# Patient Record
Sex: MALE | Race: WHITE | Employment: FULL TIME | URBAN - METROPOLITAN AREA
[De-identification: names, ages, dates, MRNs, and addresses within clinical notes are randomized per-mention and may not be internally consistent; named-entity substitution may affect disease eponyms.]

---

## 2022-06-03 ENCOUNTER — HOSPITAL ENCOUNTER (OUTPATIENT)
Age: 32
Discharge: HOME | End: 2022-06-03

## 2022-06-03 LAB
ALBUMIN SERPL-MCNC: 4.4 G/DL (ref 3.5–5.1)
ALP BLD-CCNC: 87 U/L (ref 38–126)
ALT SERPL-CCNC: 20 U/L (ref 11–66)
ANION GAP SERPL CALCULATED.3IONS-SCNC: 12 MEQ/L (ref 8–16)
AST SERPL-CCNC: 20 U/L (ref 5–40)
BASOPHILS # BLD: 0.2 %
BASOPHILS ABSOLUTE: 0 THOU/MM3 (ref 0–0.1)
BILIRUB SERPL-MCNC: 1 MG/DL (ref 0.3–1.2)
BUN BLDV-MCNC: 10 MG/DL (ref 7–22)
CALCIUM SERPL-MCNC: 8.8 MG/DL (ref 8.5–10.5)
CHLORIDE BLD-SCNC: 102 MEQ/L (ref 98–111)
CHOLESTEROL, TOTAL: 175 MG/DL (ref 100–199)
CO2: 24 MEQ/L (ref 23–33)
CREAT SERPL-MCNC: 0.7 MG/DL (ref 0.4–1.2)
EOSINOPHIL # BLD: 2.9 %
EOSINOPHILS ABSOLUTE: 0.3 THOU/MM3 (ref 0–0.4)
ERYTHROCYTE [DISTWIDTH] IN BLOOD BY AUTOMATED COUNT: 17.9 % (ref 11.5–14.5)
ERYTHROCYTE [DISTWIDTH] IN BLOOD BY AUTOMATED COUNT: 50.3 FL (ref 35–45)
GFR SERPL CREATININE-BSD FRML MDRD: > 90 ML/MIN/1.73M2
GLUCOSE BLD-MCNC: 97 MG/DL (ref 70–108)
HCT VFR BLD CALC: 52.2 % (ref 42–52)
HDLC SERPL-MCNC: 32 MG/DL
HEMOGLOBIN: 16.1 GM/DL (ref 14–18)
IMMATURE GRANS (ABS): 0.04 THOU/MM3 (ref 0–0.07)
IMMATURE GRANULOCYTES: 0.5 %
LDL CHOLESTEROL CALCULATED: 123 MG/DL
LYMPHOCYTES # BLD: 23.5 %
LYMPHOCYTES ABSOLUTE: 2.1 THOU/MM3 (ref 1–4.8)
MCH RBC QN AUTO: 26.1 PG (ref 26–33)
MCHC RBC AUTO-ENTMCNC: 30.8 GM/DL (ref 32.2–35.5)
MCV RBC AUTO: 84.6 FL (ref 80–94)
MONOCYTES # BLD: 10.3 %
MONOCYTES ABSOLUTE: 0.9 THOU/MM3 (ref 0.4–1.3)
NUCLEATED RED BLOOD CELLS: 0 /100 WBC
PLATELET # BLD: 203 THOU/MM3 (ref 130–400)
PMV BLD AUTO: 10.2 FL (ref 9.4–12.4)
POTASSIUM SERPL-SCNC: 4.2 MEQ/L (ref 3.5–5.2)
RBC # BLD: 6.17 MILL/MM3 (ref 4.7–6.1)
SEG NEUTROPHILS: 62.6 %
SEGMENTED NEUTROPHILS ABSOLUTE COUNT: 5.5 THOU/MM3 (ref 1.8–7.7)
SODIUM BLD-SCNC: 138 MEQ/L (ref 135–145)
TOTAL PROTEIN: 7.3 G/DL (ref 6.1–8)
TRIGL SERPL-MCNC: 98 MG/DL (ref 0–199)
WBC # BLD: 8.8 THOU/MM3 (ref 4.8–10.8)

## 2023-02-13 ENCOUNTER — HOSPITAL ENCOUNTER (INPATIENT)
Age: 33
LOS: 1 days | Discharge: HOME | DRG: 754 | End: 2023-02-14
Attending: EMERGENCY MEDICINE | Admitting: PSYCHIATRY & NEUROLOGY
Payer: COMMERCIAL

## 2023-02-13 DIAGNOSIS — R45.851 SUICIDAL IDEATION: Primary | ICD-10-CM

## 2023-02-13 PROBLEM — F33.0 MDD (MAJOR DEPRESSIVE DISORDER), RECURRENT EPISODE, MILD (HCC): Status: ACTIVE | Noted: 2023-02-13

## 2023-02-13 LAB
ALBUMIN SERPL BCG-MCNC: 4.2 G/DL (ref 3.5–5.1)
ALP SERPL-CCNC: 77 U/L (ref 38–126)
ALT SERPL W/O P-5'-P-CCNC: 25 U/L (ref 11–66)
ANION GAP SERPL CALC-SCNC: 8 MEQ/L (ref 8–16)
APAP SERPL-MCNC: < 5 UG/ML (ref 0–20)
AST SERPL-CCNC: 17 U/L (ref 5–40)
BASOPHILS ABSOLUTE: 0 THOU/MM3 (ref 0–0.1)
BASOPHILS NFR BLD AUTO: 0.3 %
BILIRUB SERPL-MCNC: 1.5 MG/DL (ref 0.3–1.2)
BUN SERPL-MCNC: 10 MG/DL (ref 7–22)
CALCIUM SERPL-MCNC: 9 MG/DL (ref 8.5–10.5)
CHLORIDE SERPL-SCNC: 101 MEQ/L (ref 98–111)
CO2 SERPL-SCNC: 26 MEQ/L (ref 23–33)
CREAT SERPL-MCNC: 0.6 MG/DL (ref 0.4–1.2)
DEPRECATED RDW RBC AUTO: 43.1 FL (ref 35–45)
EOSINOPHIL NFR BLD AUTO: 1.2 %
EOSINOPHILS ABSOLUTE: 0.1 THOU/MM3 (ref 0–0.4)
ERYTHROCYTE [DISTWIDTH] IN BLOOD BY AUTOMATED COUNT: 14.2 % (ref 11.5–14.5)
ETHANOL SERPL-MCNC: < 0.01 %
GFR SERPL CREATININE-BSD FRML MDRD: > 60 ML/MIN/1.73M2
GLUCOSE SERPL-MCNC: 102 MG/DL (ref 70–108)
HCT VFR BLD AUTO: 47.7 % (ref 42–52)
HGB BLD-MCNC: 15.1 GM/DL (ref 14–18)
IMM GRANULOCYTES # BLD AUTO: 0.04 THOU/MM3 (ref 0–0.07)
IMM GRANULOCYTES NFR BLD AUTO: 0.5 %
LYMPHOCYTES ABSOLUTE: 2 THOU/MM3 (ref 1–4.8)
LYMPHOCYTES NFR BLD AUTO: 25.9 %
MCH RBC QN AUTO: 26.6 PG (ref 26–33)
MCHC RBC AUTO-ENTMCNC: 31.7 GM/DL (ref 32.2–35.5)
MCV RBC AUTO: 84.1 FL (ref 80–94)
MONOCYTES ABSOLUTE: 0.8 THOU/MM3 (ref 0.4–1.3)
MONOCYTES NFR BLD AUTO: 10.1 %
NEUTROPHILS NFR BLD AUTO: 62 %
NRBC BLD AUTO-RTO: 0 /100 WBC
OSMOLALITY SERPL CALC.SUM OF ELEC: 269.3 MOSMOL/KG (ref 275–300)
PLATELET # BLD AUTO: 203 THOU/MM3 (ref 130–400)
PMV BLD AUTO: 9.7 FL (ref 9.4–12.4)
POTASSIUM SERPL-SCNC: 4.5 MEQ/L (ref 3.5–5.2)
PROT SERPL-MCNC: 7.5 G/DL (ref 6.1–8)
RBC # BLD AUTO: 5.67 MILL/MM3 (ref 4.7–6.1)
SALICYLATES SERPL-MCNC: < 0.3 MG/DL (ref 2–10)
SEGMENTED NEUTROPHILS ABSOLUTE COUNT: 4.8 THOU/MM3 (ref 1.8–7.7)
SODIUM SERPL-SCNC: 135 MEQ/L (ref 135–145)
WBC # BLD AUTO: 7.7 THOU/MM3 (ref 4.8–10.8)

## 2023-02-13 PROCEDURE — 80179 DRUG ASSAY SALICYLATE: CPT

## 2023-02-13 PROCEDURE — 1240000000 HC EMOTIONAL WELLNESS R&B

## 2023-02-13 PROCEDURE — 99285 EMERGENCY DEPT VISIT HI MDM: CPT

## 2023-02-13 PROCEDURE — 82077 ASSAY SPEC XCP UR&BREATH IA: CPT

## 2023-02-13 PROCEDURE — 80053 COMPREHEN METABOLIC PANEL: CPT

## 2023-02-13 PROCEDURE — 93005 ELECTROCARDIOGRAM TRACING: CPT | Performed by: EMERGENCY MEDICINE

## 2023-02-13 PROCEDURE — 93010 ELECTROCARDIOGRAM REPORT: CPT | Performed by: INTERNAL MEDICINE

## 2023-02-13 PROCEDURE — 36415 COLL VENOUS BLD VENIPUNCTURE: CPT

## 2023-02-13 PROCEDURE — 85025 COMPLETE CBC W/AUTO DIFF WBC: CPT

## 2023-02-13 PROCEDURE — 6370000000 HC RX 637 (ALT 250 FOR IP): Performed by: PSYCHIATRY & NEUROLOGY

## 2023-02-13 PROCEDURE — 80143 DRUG ASSAY ACETAMINOPHEN: CPT

## 2023-02-13 RX ORDER — ACETAMINOPHEN 325 MG/1
650 TABLET ORAL EVERY 4 HOURS PRN
Status: DISCONTINUED | OUTPATIENT
Start: 2023-02-13 | End: 2023-02-14 | Stop reason: HOSPADM

## 2023-02-13 RX ORDER — TRAZODONE HYDROCHLORIDE 50 MG/1
50 TABLET ORAL NIGHTLY PRN
Status: DISCONTINUED | OUTPATIENT
Start: 2023-02-13 | End: 2023-02-14 | Stop reason: HOSPADM

## 2023-02-13 RX ORDER — IBUPROFEN 400 MG/1
400 TABLET ORAL EVERY 6 HOURS PRN
Status: DISCONTINUED | OUTPATIENT
Start: 2023-02-13 | End: 2023-02-14 | Stop reason: HOSPADM

## 2023-02-13 RX ORDER — BUSPIRONE HYDROCHLORIDE 15 MG/1
15 TABLET ORAL 3 TIMES DAILY
COMMUNITY

## 2023-02-13 RX ORDER — MAGNESIUM HYDROXIDE/ALUMINUM HYDROXICE/SIMETHICONE 120; 1200; 1200 MG/30ML; MG/30ML; MG/30ML
30 SUSPENSION ORAL EVERY 6 HOURS PRN
Status: DISCONTINUED | OUTPATIENT
Start: 2023-02-13 | End: 2023-02-14 | Stop reason: HOSPADM

## 2023-02-13 RX ORDER — NICOTINE 21 MG/24HR
1 PATCH, TRANSDERMAL 24 HOURS TRANSDERMAL DAILY
Status: DISCONTINUED | OUTPATIENT
Start: 2023-02-13 | End: 2023-02-14 | Stop reason: HOSPADM

## 2023-02-13 RX ORDER — HYDROXYZINE HYDROCHLORIDE 25 MG/1
25 TABLET, FILM COATED ORAL 3 TIMES DAILY PRN
COMMUNITY

## 2023-02-13 RX ORDER — HYDROXYZINE HYDROCHLORIDE 25 MG/1
50 TABLET, FILM COATED ORAL 3 TIMES DAILY PRN
Status: DISCONTINUED | OUTPATIENT
Start: 2023-02-13 | End: 2023-02-14 | Stop reason: HOSPADM

## 2023-02-13 RX ADMIN — ACETAMINOPHEN 650 MG: 325 TABLET ORAL at 15:55

## 2023-02-13 ASSESSMENT — PATIENT HEALTH QUESTIONNAIRE - PHQ9: SUM OF ALL RESPONSES TO PHQ QUESTIONS 1-9: 7

## 2023-02-13 ASSESSMENT — SLEEP AND FATIGUE QUESTIONNAIRES
DO YOU HAVE DIFFICULTY SLEEPING: YES
SLEEP PATTERN: DIFFICULTY FALLING ASLEEP;EARLY AWAKENING
AVERAGE NUMBER OF SLEEP HOURS: 3
DO YOU USE A SLEEP AID: NO

## 2023-02-13 ASSESSMENT — PAIN - FUNCTIONAL ASSESSMENT
PAIN_FUNCTIONAL_ASSESSMENT: ACTIVITIES ARE NOT PREVENTED
PAIN_FUNCTIONAL_ASSESSMENT: NONE - DENIES PAIN

## 2023-02-13 ASSESSMENT — LIFESTYLE VARIABLES
HOW MANY STANDARD DRINKS CONTAINING ALCOHOL DO YOU HAVE ON A TYPICAL DAY: PATIENT DOES NOT DRINK
HOW MANY STANDARD DRINKS CONTAINING ALCOHOL DO YOU HAVE ON A TYPICAL DAY: PATIENT DOES NOT DRINK
HOW OFTEN DO YOU HAVE A DRINK CONTAINING ALCOHOL: NEVER
HOW OFTEN DO YOU HAVE A DRINK CONTAINING ALCOHOL: NEVER

## 2023-02-13 ASSESSMENT — PAIN SCALES - WONG BAKER: WONGBAKER_NUMERICALRESPONSE: 0

## 2023-02-13 ASSESSMENT — PAIN SCALES - GENERAL
PAINLEVEL_OUTOF10: 0
PAINLEVEL_OUTOF10: 0
PAINLEVEL_OUTOF10: 4
PAINLEVEL_OUTOF10: 0
PAINLEVEL_OUTOF10: 0

## 2023-02-13 ASSESSMENT — PAIN DESCRIPTION - DESCRIPTORS: DESCRIPTORS: ACHING

## 2023-02-13 ASSESSMENT — PAIN DESCRIPTION - LOCATION: LOCATION: HEAD

## 2023-02-13 NOTE — ED TRIAGE NOTES
Presents to ED via police for mental health evaluation. Reports he has high anxiety and depression. Girlfriend called the  today because he made a suicidal comment. Denies plan. Patient placed in safe room that is ligature resistant with continuous monitoring in place. Provider notified, requested an assessment by behavioral health . Patient belongings secured in a locked lockers outside of the room. Explained suicide prevention precautions to the patient including constant observer.

## 2023-02-13 NOTE — DISCHARGE INSTRUCTIONS
Keep all follow-up appointments and take medications as directed. Call the hope line if needed at :  Ck Velázquez, and [facility]. [facility] [phone]. Rocco Armstrong [phone]. [facility] [phone]. [address] [employer] [phone]. Vee Álvarez and [place] [phone]    Symptoms to report to your Doctor:  Depression  Inability to eat, sleep, or have a bowel movement  Increased sleepiness and lethargy  Voices in your head  Any thoughts of harming self or others    Things to avoid:  Caffeine  Alcohol  No street drugs  Over the counter medications unless Ok'd by your physician or pharmacist.  Driving or operating machinery until full effects of your medications are known. Driving or operating machinery if dizzy or drowsy from medications. Use journal as directed. Education:  Illness and medication teaching was completed. Discharge Disposition: Patient was discharged to *** and was transported by***. Patient was accompanied by***. Information sent to next level of care:    ____[facility]    ___x_Discharge instructions    ____Behavioral Services Assessment    ____Hand off Summary    ____History and Physical    ____Last dose MAR    ____Patient transfer form    ____Other       [facility] Hotline:  [phone]    Crisis phone numbers:  Ck Velázquez, and .S. [facility] [phone]. [facility] [address] [facility] [phone]. [employer] [phone]. [address] Carmen Pierson [phone].     [facility]  [address] [facility], 100 [facility]  1307 [facility]  [address] [facility]  [facility] 166  [facility] 57  [facility], 40 [facility] [place] 99  Ariel Be[address]ley[address]aats 211  [address] [facility] 2210 [facility], [address]  [address] [place]  Recovery and [facility]  [address] [facility]  [phone]    [facility]  [address]  [address]iguel Angel [address], [address]  [address] [facility] [address]  [place], 199 [facility]  [address]  [address], 1000 [facility]  Recovery and [facility]  [address],Miguel Angel [address], 396 [place]  [phone]    [facility] [place]  [address] Fito Christianson 15, [address]  [address]Th Floor  1 [facility],5Th Floor West   [place] Rubina Lopez 799  677 [facility]  [address]  [place], [address]  Murali Purvis 180  [address]  [facility] 163   [place], 3000 [facility] [address]  [phone]

## 2023-02-13 NOTE — PROGRESS NOTES
Chief Complaint:   mental health problem      Provisional Diagnosis:  major depressive disorder      Risk, Psychosocial and Contextual Factors: currently laid off of work      Current MH Treatment: PCP      Present Suicidal Behavior:    Verbal: denies    Attempt: denies      Access to Weapons: denies      C-SSRS Current Suicide Risk: Low, Moderate or High:  low risk        Past Suicidal Behavior:    Verbal: yes    Attempts: denies      Self-Injurious/Self-Mutilation: denies      Traumatic Event Within Past 2 Weeks: denies      Current Abuse:  denies      Legal: denies      Violence: denies      Protective Factors:  positive support      Housing: lives with jillian, two bio children and two step-children      Clinical Summary:      Pt is a 27year old male who presents to ED on [facility]. Per [facility]: \"On 23  [facility], Piot[facility]'s sister requested one due to him texting his family saying he loved them all, after telling his mother he was depressed. I was later told by his girlfriend, he had sent her suicidal messages on the night of 23. Messages stated, it's his time to leave the earth, he was at peace, after being asked how his daughters would feel if he was dead, he replied, they won't see or find him. Other messages state, he's been ready for this moment, and he has already told his family he loved them. \". Pt reports he is here because the  showed up and put him in handcuffs. Pt denies any suicidal/homicidal ideation. Pt reports sometimes he feels overwhelmed and anxious and in those times he feels like he doesn't want to be here anymore. Pt denies forming any plan or having intent to kill himself. Pt states \"like if I was driving my truck and I wrecked and , thank God because I don't have to deal with this anymore, but I wouldn't do it on purpose\". Pt denies any hallucinations.  Pt reports his PCP prescribed him Buspirone and  Hydroxyzine which he does not take because the Buspirone makes him agitated/angry and the Hydroxyzine makes him tired. Pt reports marijauna use which he feels is helpful with his anxiety. Pt denies any other substance use. No delusions noted. Pt expressed concern as his fiance is currently in [place] and he is caring for their 4 children who are currently at school. Level of Care Disposition:      Consulted with Dr. Richard Jackson, psychiatry to be consulted prior to medical clearance. Consulted with Dr. Braydon Fuchs. Pt to be admitted to 4E. Dr. Richard Jackson updated on Dr. Mir Goodson recommendation to admit. Dr. Richard Jackson to order labs. Pt updated on POC. Pt upset, concerned about his kids. Reports he has court this Thursday for custody of his son. Pt provided with phone to contact pt mother. Pt mother contacted,  has been arranged by pt mom. Consulted with Dr. Richard Jackson. Pt is medically cleared. Report given to Nurse Viki Singer on 4E.

## 2023-02-13 NOTE — PROGRESS NOTES
Pt arrived on unit accompanied by ED staff and campus police in hospital scrubs and non skid slippers.  Provided gatorade introduced self as admission nurse
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
## 2023-02-13 NOTE — BH NOTE
INPATIENT RECREATIONAL THERAPY  ADULT BEHAVIORAL SERVICES  EVALUATION    REFERRING PHYSICIAN:  Dr. Casey Adam  DIAGNOSIS:    Major Depressive Disorder, Recurrent Episode, Mild  PRECAUTIONS:   Standard precautions    HISTORY OF PRESENT ILLNESS/INJURY:  Patient was admitted to the unit due to suicidal ideation and depression. Patient apparently texted suicidal messages to his family members prior to admission. Patient reported stress due to an upcoming custody court date this Thursday, February 16th. Patient is worried about missing this custody hearing. Patient also stated that his fiance is currently in [place] and that he needs to get discharged so he can go home to take care of his 4 children. Patient denies all. PMH:  Please see medical chart for prior medical history, allergies, and medication    HISTORY OF PSYCHIATRIC TREATMENT:  OP: PCP - noncompliant    DATE OF BIRTH:  [birth year]   GENDER:  male  MARITAL STATUS:  Patient has a fiance, 2 children and 2 stepchildren. EMPLOYMENT STATUS:  Employed    LIVING SITUATION/SUPPORT:  Patient lives with his fiance, his 2 children and his 2 stepchildren. EDUCATIONAL LEVEL:   graduate    MEDICATION/DRUG USE:  Noncompliance with medications. Marijuana use. LEISURE INTERESTS:  outdoor activities, motorcycle riding, four wheeling, family activities, reading, listening to music, playing cards, watching TV/Movies  ACTIVITY PREFERENCE:  small group  ACTIVITY TYPES:   Passive. Active. Indoor. Outdoor. COGNITION:  A&Ox4    COPING:   poor  ATTENTION:   fair  RELAXATION:  Patient reported anxiety, poor sleep and racing thoughts.    SELF-ESTEEM:  poor  MOTIVATION:   poor - poor insight    SOCIAL SKILLS:   good  FRUSTRATION TOLERANCE:   fair  ATTENTION SEEKING:  none noted  COOPERATION:  cooperative and pleasant  AFFECT:  blunt  APPEARANCE:  appropriate    HEARING:    no problems noted   VISION:  no problems noted  VERBAL COMMUNICATION:   no problems noted  WRITTEN COMMUNICATION:  no problems noted    COORDINATION:  No problems noted  MOBILITY:  Ambulates independently    GOALS:   Identify 2 new positive coping skills by time of discharge.
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
## 2023-02-13 NOTE — ED NOTES
Patient resting in bed. Respirations easy and unlabored. No distress noted.         Keena Silva RN  02/13/23 1829

## 2023-02-14 VITALS
SYSTOLIC BLOOD PRESSURE: 139 MMHG | HEIGHT: 68 IN | BODY MASS INDEX: 36.83 KG/M2 | OXYGEN SATURATION: 97 % | HEART RATE: 73 BPM | DIASTOLIC BLOOD PRESSURE: 113 MMHG | WEIGHT: 243 LBS | TEMPERATURE: 97.3 F | RESPIRATION RATE: 18 BRPM

## 2023-02-14 PROBLEM — R45.851 DEPRESSION WITH SUICIDAL IDEATION: Status: ACTIVE | Noted: 2023-02-13

## 2023-02-14 PROBLEM — F32.A DEPRESSION WITH SUICIDAL IDEATION: Status: ACTIVE | Noted: 2023-02-13

## 2023-02-14 PROCEDURE — 5130000000 HC BRIDGE APPOINTMENT

## 2023-02-14 PROCEDURE — APPSS30 APP SPLIT SHARED TIME 16-30 MINUTES: Performed by: PHYSICIAN ASSISTANT

## 2023-02-14 RX ORDER — SERTRALINE HYDROCHLORIDE 25 MG/1
25 TABLET, FILM COATED ORAL DAILY
Status: DISCONTINUED | OUTPATIENT
Start: 2023-02-14 | End: 2023-02-14 | Stop reason: HOSPADM

## 2023-02-14 ASSESSMENT — PAIN DESCRIPTION - LOCATION: LOCATION: BACK

## 2023-02-14 ASSESSMENT — PAIN DESCRIPTION - DESCRIPTORS: DESCRIPTORS: ACHING

## 2023-02-14 ASSESSMENT — PAIN SCALES - GENERAL: PAINLEVEL_OUTOF10: 7

## 2023-02-14 ASSESSMENT — PAIN SCALES - WONG BAKER: WONGBAKER_NUMERICALRESPONSE: 0

## 2023-02-14 ASSESSMENT — PAIN - FUNCTIONAL ASSESSMENT: PAIN_FUNCTIONAL_ASSESSMENT: ACTIVITIES ARE NOT PREVENTED

## 2023-02-14 ASSESSMENT — PAIN DESCRIPTION - ORIENTATION: ORIENTATION: MID

## 2023-02-14 NOTE — PLAN OF CARE
Problem: Risk for Elopement  Goal: Patient will not exit the unit/facility without proper excort  2/14/2023 1028 by Pastor Ghulam RN  Outcome: Completed  Flowsheets (Taken 2/14/2023 0800)  Nursing Interventions for Elopement Risk:   Communicate to physician the risk for elopement   Reduce environmental triggers   Communicate/escalate to charge nurse the risk of elopement  2/13/2023 2249 by Bang Dover RN  Outcome: Progressing  Flowsheets  Taken 2/13/2023 2249 by Bang Dover RN  Nursing Interventions for Elopement Risk:   Communicate to physician the risk for elopement   Reduce environmental triggers   Communicate/escalate to charge nurse the risk of elopement  Taken 2/13/2023 1500 by Gilberto Naik RN  Nursing Interventions for Elopement Risk:   Communicate/escalate to charge nurse the risk of elopement   Make sure patient has all necessary personal care items  Note: No elopement risk this shift. Problem: Depression  Goal: Will be euthymic at discharge  Description: INTERVENTIONS:  1. Administer medication as ordered  2. Provide emotional support via 1:1 interaction with staff  3. Encourage involvement in milieu/groups/activities  4. Monitor for social isolation  2/14/2023 1028 by Pastor Ghulam RN  Outcome: Completed  2/13/2023 2249 by Bang Dover RN  Outcome: Progressing  Note: Denies depression. Rates his mood as \"not good\". Affect flat, sad. Good eye contact. States he is hopeful for the future. On fringe with peers. Problem: Anxiety  Goal: Will report anxiety at manageable levels  Description: INTERVENTIONS:  1. Administer medication as ordered  2. Teach and rehearse alternative coping skills  3.  Provide emotional support with 1:1 interaction with staff  2/14/2023 1028 by Pastor Ghulam RN  Outcome: Completed  Flowsheets (Taken 2/14/2023 0800)  Will report anxiety at manageable levels:   Administer medication as ordered   Teach and rehearse alternative coping skills   Provide emotional support with 1:1 interaction with staff  2/13/2023 2249 by Corky Ruiz RN  Outcome: Not Progressing  Flowsheets (Taken 2/13/2023 2249)  Will report anxiety at manageable levels:   Administer medication as ordered   Teach and rehearse alternative coping skills   Provide emotional support with 1:1 interaction with staff  Note: Continues to be anxious this shift. Refused to take any anxiety medications. Problem: Sleep Disturbance  Goal: Will exhibit normal sleeping pattern  Description: INTERVENTIONS:  1. Administer medication as ordered  2. Decrease environmental stimuli, including noise, as appropriate  3. Discourage social isolation and naps during the day  2/14/2023 1028 by Andrew Salmeron RN  Outcome: Completed  2/13/2023 2249 by Corky Ruiz RN  Outcome: Progressing  Note: States hasn't slept much in the last couple of days. Refused trazodone this shift. Problem: Involuntary Admit  Goal: Will cooperate with staff recommendations and doctor's orders and will demonstrate appropriate behavior  Description: INTERVENTIONS:  1. Treat underlying conditions and offer medication as ordered  2. Educate regarding involuntary admission procedures and rules  3.  Contain excessive/inappropriate behavior per unit and hospital policies  [id number] by Andrew Salmeron RN  Outcome: Completed  Flowsheets (Taken 2/14/2023 0800)  Will cooperate with staff recommendations and doctor's orders and will demonstrate appropriate behavior: Treat underlying conditions and offer medication as ordered  2/13/2023 2249 by Corky Ruiz RN  Outcome: Progressing  Flowsheets (Taken 2/13/2023 2249)  Will cooperate with staff recommendations and doctor's orders and will demonstrate appropriate behavior: Treat underlying conditions and offer medication as ordered     Problem: Discharge Planning  Goal: Discharge to home or other facility with appropriate resources  2/14/2023 1028 by Andrew Salmeron RN  Outcome: Completed  Flowsheets (Taken 2/14/2023 0800)  Discharge to home or other facility with appropriate resources: Identify barriers to discharge with patient and caregiver  2/13/2023 2249 by Zan Jonas RN  Outcome: Progressing  Flowsheets (Taken 2/13/2023 2249)  Discharge to home or other facility with appropriate resources: Identify barriers to discharge with patient and caregiver     Problem: Pain  Goal: Verbalizes/displays adequate comfort level or baseline comfort level  Outcome: Completed     Problem: Anxiety  Goal: Will report anxiety at manageable levels  Description: INTERVENTIONS:  1. Administer medication as ordered  2. Teach and rehearse alternative coping skills  3. Provide emotional support with 1:1 interaction with staff  2/14/2023 1028 by Clifford Morse RN  Outcome: Completed  Flowsheets (Taken 2/14/2023 0800)  Will report anxiety at manageable levels:   Administer medication as ordered   Teach and rehearse alternative coping skills   Provide emotional support with 1:1 interaction with staff  2/13/2023 2249 by Zan Jonas RN  Outcome: Not Progressing  Flowsheets (Taken 2/13/2023 2249)  Will report anxiety at manageable levels:   Administer medication as ordered   Teach and rehearse alternative coping skills   Provide emotional support with 1:1 interaction with staff  Note: Continues to be anxious this shift. Refused to take any anxiety medications.

## 2023-02-14 NOTE — DISCHARGE SUMMARY
Provider Discharge Summary     Patient ID:  Alexys Gordon  [id number]  27 y.o.  1990    Admit date: 2/13/2023    Discharge date and time: 2/14/2023  2:05 PM     Admitting Physician: Frank Webb MD     Discharge Physician: Frank Webb MD    Admission Diagnoses: Suicidal ideation [R45.851]  MDD (major depressive disorder), recurrent episode, [facility] ([facility] [F33.0]    Discharge Diagnoses:      Depression with suicidal ideation     Patient Active Problem List   Diagnosis Code    Depression with suicidal ideation F32. A, R45.851        Admission Condition: poor    Discharged Condition: stable    Indication for Admission: threat to self    History of Present Illnes (present tense wording is of findings from admission exam and are not necessarily indicative of current findings) and  Hospital Course:   Patient is a 26-year-old male with extensive history of anxiety admitted on a [facility] after he texted his fiancée and sister stating that he has been dealing with severe depression and anxiety and has thoughts that he would be in peace and better off not living. Patient has been giving some conflicting report following which we had to call his fiancée. His fiancée clarified that he did not make any direct suicide statement however he did mention that he would be better off dead. Reports that she does not feel like he would act on these thoughts and identifies his children as a protective factor. Patient does report that he has been feel increasingly pressed for last several weeks. Reports that he has been trying to take BuSpar as he does not want to try any SSRIs due to severe sexual side effects. Discussed with him about trying Wellbutrin Effexor or Remeron to help with mood and anxiety and not have much sexual side effects. Patient is not interested in these options at this time. Reports that he will discuss this further with his primary care physician.   After further evaluation it was determined patient can be safely discharged home after the initial observation period. Consults:   none    Significant Diagnostic Studies: Routine labs and diagnostics    Treatments: Psychotropic medications, therapy with group, milieu, and 1:1 with nurses, social workers, PAPATSY/CNP, and Attending physician. Discharge Medications:  Discharge Medication List as of 2/14/2023 10:57 AM        CONTINUE these medications which have NOT CHANGED    Details   busPIRone (BUSPAR) 15 MG tablet Take 15 mg by mouth 3 times dailyHistorical Med      hydrOXYzine HCl (ATARAX) 25 MG tablet Take 25 mg by mouth 3 times daily as needed for ItchingHistorical Med              Core Measures statement:   Not applicable    Discharge Exam:  Level of consciousness:  Within normal limits  Appearance: Street clothes, seated, with good grooming  Behavior/Motor: No abnormalities noted  Attitude toward examiner:  Cooperative, attentive, good eye contact  Speech:  spontaneous, normal rate, normal volume and well articulated  Mood:  euthymic  Affect:  Full range  Thought processes:  linear, goal directed and coherent  Thought content:  denies homicidal ideation  Suicidal Ideation:  denies suicidal ideation  Delusions:  no evidence of delusions  Perceptual Disturbance:  denies any perceptual disturbance  Cognition:  Intact  Memory: age appropriate  Insight & Judgement: fair  Medication side effects: denies     Disposition: home    Patient Instructions: Activity: activity as tolerated  1. Patient instructed to take medications regularly and follow up with outpatient appointments. Follow-up as scheduled with PCP       Signed:    Electronically signed by Velma Sutherland MD on 2/14/23 at 2:05 PM EST    Time Spent on discharge is more than 55 minutes in the examination, evaluation, counseling and review of medications and discharge plan.

## 2023-02-14 NOTE — PLAN OF CARE
Problem: Risk for Elopement  Goal: Patient will not exit the unit/facility without proper excort  Outcome: Progressing  Flowsheets  Taken 2/13/2023 2249 by Radha Beebe RN  Nursing Interventions for Elopement Risk:   Communicate to physician the risk for elopement   Reduce environmental triggers   Communicate/escalate to charge nurse the risk of elopement  Taken 2/13/2023 1500 by Shannon Tipton RN  Nursing Interventions for Elopement Risk:   Communicate/escalate to charge nurse the risk of elopement   Make sure patient has all necessary personal care items  Note: No elopement risk this shift. Problem: Depression  Goal: Will be euthymic at discharge  Description: INTERVENTIONS:  1. Administer medication as ordered  2. Provide emotional support via 1:1 interaction with staff  3. Encourage involvement in milieu/groups/activities  4. Monitor for social isolation  Outcome: Progressing  Note: Denies depression. Rates his mood as \"not good\". Affect flat, sad. Good eye contact. States he is hopeful for the future. On fringe with peers. Problem: Anxiety  Goal: Will report anxiety at manageable levels  Description: INTERVENTIONS:  1. Administer medication as ordered  2. Teach and rehearse alternative coping skills  3. Provide emotional support with 1:1 interaction with staff  Outcome: Not Progressing  Flowsheets (Taken 2/13/2023 2249)  Will report anxiety at manageable levels:   Administer medication as ordered   Teach and rehearse alternative coping skills   Provide emotional support with 1:1 interaction with staff  Note: Continues to be anxious this shift. Refused to take any anxiety medications. Problem: Sleep Disturbance  Goal: Will exhibit normal sleeping pattern  Description: INTERVENTIONS:  1. Administer medication as ordered  2. Decrease environmental stimuli, including noise, as appropriate  3.  Discourage social isolation and naps during the day  Outcome: Progressing  Note: States hasn't slept much in the last couple of days. Refused trazodone this shift. Problem: Involuntary Admit  Goal: Will cooperate with staff recommendations and doctor's orders and will demonstrate appropriate behavior  Description: INTERVENTIONS:  1. Treat underlying conditions and offer medication as ordered  2. Educate regarding involuntary admission procedures and rules  3.  Contain excessive/inappropriate behavior per unit and hospital policies  Outcome: Progressing  Flowsheets (Taken 2/13/2023 2249)  Will cooperate with staff recommendations and doctor's orders and will demonstrate appropriate behavior: Treat underlying conditions and offer medication as ordered     Problem: Discharge Planning  Goal: Discharge to home or other facility with appropriate resources  Outcome: Progressing  Flowsheets (Taken 2/13/2023 2249)  Discharge to home or other facility with appropriate resources: Identify barriers to discharge with patient and caregiver
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
## 2023-02-14 NOTE — PROGRESS NOTES
Behavioral Health   Discharge Note    Pt discharged with followings belongings:   Dental Appliances: None  Vision - Corrective Lenses: None  Hearing Aid: None  Jewelry: None  Body Piercings Removed: N/A  Clothing: Footwear, Shirt, Socks, Undergarments, Pants  Other Valuables: Other (Comment), Keys (vap pen)   Valuables retrieved from safe, security envelope number:  NA and returned to patient. Patient left department with Writer via Ambulation . Discharged to Home. \"An Important Message from Medicare About Your Rights\" (IMM) form photocopy original from admission and provided to pt at least 4 hours prior to discharge N/A. If pt left within 4 hours of receiving 2nd delivery of IMM, this is because pt was agreeable with hospital discharge. Patient/guardian education on aftercare instructions: Yes  Bridge appointment completed:  yes. Reviewed Discharge Instructions with patient/family/nursing facility. Patient/family verbalizes understanding and agreement with the discharge plan using the teachback method. Information faxed to NA by NA Patient/family verbalize understanding of AVS:Yes    Status EXAM upon discharge:  Mental Status and Behavioral Exam  Normal: No  Level of Assistance: Independent/Self  Facial Expression: Flat  Affect: Blunt  Level of Consciousness: Alert  Frequency of Checks: 4 times per hour, close  Mood:Normal: No  Mood: Depressed (\"A Little\" per patient.)  Motor Activity:Normal: Yes  Eye Contact: Good  Observed Behavior: Cooperative, Guarded  Sexual Misconduct History: Current - no  Preception: [place] to person, [place] to time, [place] to place, [place] to situation  Attention:Normal: Yes  Attention: Distractible  Thought Processes: Other (comment) (Logical)  Thought Content:Normal: Yes  Depression Symptoms: Isolative, Impaired concentration, Loss of interest  Anxiety Symptoms: No problems reported or observed.  (Patient Denies at this time)  Avelina Symptoms: No problems reported or observed.   Hallucinations: None (Patient denies at this time)  Delusions: No  Memory:Normal: Yes  Insight and Judgment: No  Insight and Judgment: Poor judgment, Poor insight, Unrealistic    Kiet Calles RN

## 2023-02-14 NOTE — H&P
Department of Psychiatry  Psychiatric Assessment   Reason for Admission to Psychiatric Unit:  Threat to self requiring 24 hour professional observation  Concerns about patient's safety in the community    CHIEF COMPLAINT:  suicidal ideation     HISTORY OF PRESENT ILLNESS:      Tommie Hsu is a 27 y.o. male with a history of anxiety who presented to the emergency department  on [facility] deu to suicidal  ideation. Per [facility]: \"On 23  Deputies were dispatched to Piotr's residence for a well-being check, Piotr's sister requested one due to him texting his family saying he loved them all, after telling his mother he was depressed. I was later told by his girlfriend, he had sent her suicidal messages on the night of 23. Messages stated, it's his time to leave the earth, he was at peace, after being asked how his daughters would feel if he was dead, he replied, they won't see or find him. Other messages state, he's been ready for this moment, and he has already told his family he loved them. \".     Per the [facility] social work note: \"Pt reports he is here because the  showed up and put him in handcuffs. Pt denies any suicidal/homicidal ideation. Pt reports sometimes he feels overwhelmed and anxious and in those times he feels like he doesn't want to be here anymore. Pt denies forming any plan or having intent to kill himself. Pt states \"like if I was driving my truck and I wrecked and , thank God because I don't have to deal with this anymore, but I wouldn't do it on purpose\". Pt denies any hallucinations. Pt reports his PCP prescribed him Buspirone and  Hydroxyzine which he does not take because the Buspirone makes him agitated/angry and the Hydroxyzine makes him tired. Pt reports marijauna use which he feels is helpful with his anxiety. Pt denies any other substance use. No delusions noted.  Pt expressed concern as his fiance is currently in [place] and he is caring for their 4 children who are currently at school. \"    Kat Handley reports on Sunday night he was anxious. He states his anxiety was high and his fiancée was worried about him. He states his fiancée went to [place] on Friday to visit her parents. He says the last time his fiancé went to [place] she cheated on him. He states he only gets anxious now when she leaves because he caught her cheating in the past.  He says he had 1 million thoughts going through his mind on Sunday night. He reports his fiancée was trying to calm him down by telling him to deep breathe. She also told him to not do anything stupid. He reports that night, he texted his fiancée and told her \"if I was going to do anything it would be when the kids were not around. \"  He states he was up until 4 or 4:30 am that night due to his anxiety. He went to sleep for a few hours and then woke up around 630 to get his kids ready for school. He states during that time he was sleeping he did not answer any of his family's phone calls. He says because he was alone and the kids were at school everyone \"freaked out. \"  He then states that he texted a group with his family members around 330am that he loved them. He states this was not a goodbye text. He was just reaching out to his family for help because \"I was in the coming down stage. I was a little depressed. I was going to talk to the first person that replied to me. \"  He reports he has not had any suicidal ideation recently. He states \"if I was suicidal I would have been dead already. I am not suicidal I just have a lot going on. \" He  reports he would not commit suicide because he has full custody of his daughters. He also identifies his Yarsani as a protective factor and states that if he would \"off myself I would be in worse pain in hell. \"  He reports everything has been stressing him out recently. He recently took a layoff at work to spend more time with his children.   He states because of this, he is making $1200 less a week.  He also reports he has court on Thursday because his son's mother is trying to get full custody of him. Nitish Martins said his son got a few scratches on him when he was playing with the other children at his house so his wife reported it. Nitish Martins currently gets his son every other weekend. Nitish Martins also reports they are also going to court with his fiancée's \"baby daddy. \"    He reports he has just been feeling depressed at times because of everything going on. He denies feeling down and sad for more days than not. He reports he gets really good sleep at home. Appetite is been normal.  He states he has eaten the same for the last 15 years. He only eats supper. Energy has been good. Motivation has been good. He has been feeling worthless at times due to not making as much money being laid off. He denies feeling helpless or hopeless. Nitish Martins is upset that he is admitted. He feels this was a misunderstanding because he is not suicidal.  He is worried that he is going to miss his court date for custody of his son on Thursday. He denies any active suicidal ideation at this time. Denies any hallucinations. No evidence of delusions or overt psychosis on examination. PSYCHIATRIC HISTORY:      Outpatient psychiatric provider:  PCP- Stanley Jc  Suicide attempts: Denies any  Inpatient psychiatric admissions: Denies any  Home Medication Compliance: poor-stop taking his medications due to side effects     Past psychiatric medications includes:     Buspar, Hydroxyzine  Adverse reactions from psychotropic medications: BuSpar made him \"testy\"  Hydroxyzine made him tired      Past Medical History:        Diagnosis Date    Psychiatric problem        Past Surgical History:    History reviewed. No pertinent surgical history.     Medications Prior to Admission:   Medications Prior to Admission: busPIRone (BUSPAR) 15 MG tablet, Take 15 mg by mouth 3 times daily  hydrOXYzine HCl (ATARAX) 25 MG tablet, Take 25 mg by mouth 3 times daily as needed for Itching    Allergies:  Patient has no known allergies. Social History:   BORN  & RAISED IN [facility]  RESIDENCE:  lives with fishanika, two bio daughters and two step-children in [address]. His 3year-old son stays with them every other weekend  LEVEL OF EDUCATION:   11th grade  MARITAL STATUS: Patient has been  twice. His first marriage was for 5 or 6 years. His second marriage was for a year. He is currently engaged  CHILDREN: 2 daughters ages 8 and 6 from his first marriage. 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 No pertinent family history. Lifetime Psychiatric Review of Systems         Obsessions and Compulsions: denies     Avelina or Hypomania: denies     Hallucinations: denies     Panic Attacks:  denies      Delusions:  denies     Phobias: denies       Medical Review of Systems:     Constitutional: Negative for appetite change, diaphoresis, fatigue and fever. HENT: Negative for congestion, sore throat and tinnitus. Eyes: Negative for visual disturbance. Respiratory: Negative for cough, shortness of breath and wheezing. Cardiovascular: Negative for chest pain and leg swelling. Gastrointestinal: Negative for nausea, vomiting, diarrhea. Negative for abdominal pain. Genitourinary: Negative for frequency.    Musculoskeletal: Negative for arthralgias, myalgias and neck stiffness. Skin: Negative for puritis. Neurological: Negative for dizziness, weakness and headaches. All other systems reviewed and are negative. PHYSICAL EXAM:  Vitals:  BP (!) 139/113   Pulse 73   Temp 97.3 °F (36.3 °C) (Tympanic)   Resp 18   Ht 5' 8\" (1.727 m)   Wt 243 lb (110.2 kg)   SpO2 97%   BMI 36.95 kg/m²     Pain Level: Denies any pain      Physical Exam:    Constitutional: Well developed, well nourished, no acute distress  Eyes: Pupils round and reactive to light bilaterally  Neck:  Supple, no thyromegaly. Cardiovascular:  Normal rate and rhythm, normal S1 and S2. No murmur or gallop on auscultation. Radial pulses 2+ and brisk bilaterally  Lungs: Clear to auscultation bilaterally without wheezing or rales. Musculoskeletal:  Full range of motion in all four extremities. Neurologic:  Cranial nerves II through XII are grossly intact. Normal gait and station.        Mental Status Examination:    Level of consciousness:  awake  Appearance:  well-appearing, hospital attire, seated in bed, good grooming, and good hygiene  Behavior/Motor:  no abnormalities noted  Attitude toward examiner:  cooperative, attentive, and good eye contact  Speech:  spontaneous, normal rate, and normal volume  Mood: Okay per patient  Affect:  mood congruent  Thought processes:  linear, goal directed, and coherent  Thought content:  Denies homicidal ideation  Suicidal Ideation:  denies suicidal ideation  Delusions:  no evidence of delusions  Perceptual Disturbance:  denies any perceptual disturbance  Cognition: Patient is oriented to person, place, time and situation  Concentration: clinically adequate  Memory: intact  Insight & Judgement: fair         DSM-5 DIAGNOSIS:    Depression with suicidal ideation  Anxiety unspecified    Patient Active Problem List   Diagnosis    Depression with suicidal ideation          Psychosocial and Contextual Factors: Financial  Occupational  Relationship  Legal       Past Medical History:   Diagnosis Date    Psychiatric problem         Goals:    Reviewed labs  Reviewed EKG  Will obtain records and review them today. Medication adjustment as discussed with the attending physician: Will offer patient Zoloft 25mg daily. Patient is hesitant about starting antidepressant medication due to side effects  Consults: none   Encouraged patient to engage in groups, milieu, and individual therapies offered as part of programing. Behavioral Services  Medicare Certification Upon Admission    I certify that this patient's inpatient psychiatric hospital admission is medically necessary for:   X (1) Treatment which could reasonably be expected to improve this patient's condition,      X (2) Or for diagnostic study;     AND     X (2) The inpatient psychiatric services are provided while the individual is under the care of a physician and are included in the individualized plan of care. Estimated length of stay/service: Less than two midnights will be required to reach therapeutic levels of medications and to stabilize mood    Plan for post-hospital care: Follow up with outpatient psychiatric services    Electronically signed by Scarlet Jaffe PA-C on 2/14/2023 at 11:21 AM      **This report has been created using voice recognition software. It may contain minor errors which are inherent in voice recognition technology. **                                         Psychiatry Attending Attestation     I independently saw and evaluated the patient. I reviewed the Advance Practice Provider's documentation above. Any additional comments or changes to the Advance Practice Provider's documentation are stated below otherwise agree with assessment.     Patient is a 47-year-old male with extensive history of anxiety admitted on a [address] [place] after he texted his fiancée and sister stating that he has been dealing with severe depression and anxiety and has thoughts that he would be in peace and better off not living. Patient has been giving some conflicting report following which we had to call his fiancée. His fiancée clarified that he did not make any direct suicide statement however he did mention that he would be better off dead. Reports that she does not feel like he would act on these thoughts and identifies his children as a protective factor. Patient does report that he has been feel increasingly pressed for last several weeks. Reports that he has been trying to take BuSpar as he does not want to try any SSRIs due to severe sexual side effects. Discussed with him about trying Wellbutrin Effexor or Remeron to help with mood and anxiety and not have much sexual side effects. Patient is not interested in these options at this time. Reports that he will discuss this further with his primary care physician. After further evaluation it was determined patient can be safely discharged home after the initial observation period. PLAN  We will continue same medication and discharge him home today  Attempt to develop insight  Psycho-education conducted. Supportive Therapy conducted.     Electronically signed by Ignacio Fernandez MD on 2/14/23 at 2:03 PM EST

## 2023-02-14 NOTE — PROGRESS NOTES
Group Therapy Note    Date: 2/13/2023  Start Time: 2000  End Time:  2020  Number of Participants:     Type of Group: Relaxation    Wellness Binder Information  Module Name:    Session Number:      Patient's Goal:  Did not make goal this shift. Notes:  attended relaxation this shift. Status After Intervention:  Unchanged    Participation Level:  Active Listener and Interactive    Participation Quality: Appropriate      Speech:  normal      Thought Process/Content: Logical      Affective Functioning: Flat      Mood: anxious      Level of consciousness:  Alert      Response to Learning: Able to verbalize current knowledge/experience, Able to verbalize/acknowledge new learning, and Able to retain information      Endings: None Reported    Modes of Intervention: Support and Socialization      Discipline Responsible: Registered Nurse      Signature:  Patty Jenkins RN

## 2023-02-15 ENCOUNTER — TELEPHONE (OUTPATIENT)
Dept: PSYCHIATRY | Age: 33
End: 2023-02-15

## 2023-02-15 NOTE — TELEPHONE ENCOUNTER
[facility] Discharge Call Back Program. Called patient. Patient reported that there were no issues with their discharge.

## 2023-02-17 LAB
EKG ATRIAL RATE: 71 BPM
EKG P AXIS: 59 DEGREES
EKG P-R INTERVAL: 162 MS
EKG Q-T INTERVAL: 382 MS
EKG QRS DURATION: 106 MS
EKG QTC CALCULATION (BAZETT): 415 MS
EKG R AXIS: 22 DEGREES
EKG T AXIS: 19 DEGREES
EKG VENTRICULAR RATE: 71 BPM

## 2023-05-19 ENCOUNTER — HOSPITAL ENCOUNTER (EMERGENCY)
Age: 33
Discharge: HOME | End: 2023-05-19
Payer: COMMERCIAL

## 2023-05-19 VITALS
SYSTOLIC BLOOD PRESSURE: 145 MMHG | DIASTOLIC BLOOD PRESSURE: 88 MMHG | OXYGEN SATURATION: 95 % | WEIGHT: 250 LBS | HEIGHT: 68 IN | RESPIRATION RATE: 18 BRPM | HEART RATE: 85 BPM | BODY MASS INDEX: 37.89 KG/M2 | TEMPERATURE: 97 F

## 2023-05-19 DIAGNOSIS — J06.9 URI WITH COUGH AND CONGESTION: Primary | ICD-10-CM

## 2023-05-19 PROCEDURE — 99212 OFFICE O/P EST SF 10 MIN: CPT

## 2023-05-19 PROCEDURE — 99213 OFFICE O/P EST LOW 20 MIN: CPT

## 2023-05-19 RX ORDER — BENZONATATE 100 MG/1
100 CAPSULE ORAL 3 TIMES DAILY PRN
Qty: 21 CAPSULE | Refills: 0 | Status: SHIPPED | OUTPATIENT
Start: 2023-05-19 | End: 2023-05-26

## 2023-05-19 ASSESSMENT — ENCOUNTER SYMPTOMS
COUGH: 1
SORE THROAT: 0

## 2023-05-19 ASSESSMENT — PAIN DESCRIPTION - PAIN TYPE: TYPE: ACUTE PAIN

## 2023-05-19 ASSESSMENT — PAIN DESCRIPTION - DESCRIPTORS: DESCRIPTORS: ACHING;PRESSURE

## 2023-05-19 ASSESSMENT — PAIN DESCRIPTION - LOCATION: LOCATION: HEAD

## 2023-05-19 ASSESSMENT — PAIN - FUNCTIONAL ASSESSMENT
PAIN_FUNCTIONAL_ASSESSMENT: ACTIVITIES ARE NOT PREVENTED
PAIN_FUNCTIONAL_ASSESSMENT: 0-10

## 2023-05-19 ASSESSMENT — PAIN DESCRIPTION - FREQUENCY: FREQUENCY: CONTINUOUS

## 2023-05-19 ASSESSMENT — PAIN SCALES - GENERAL: PAINLEVEL_OUTOF10: 7

## 2023-05-19 ASSESSMENT — PAIN DESCRIPTION - ORIENTATION: ORIENTATION: RIGHT;LEFT

## 2023-05-19 NOTE — ED PROVIDER NOTES
Patient  reports that he has quit smoking. His smoking use included cigarettes. He uses smokeless tobacco. He reports that he does not currently use alcohol. He reports that he does not currently use drugs. PHYSICAL EXAM     ED TRIAGE VITALS  BP: (!) 145/88, Temp: 97 °F (36.1 °C), Pulse: 85, Respirations: 18, SpO2: 95 %,Estimated body mass index is 38.01 kg/m² as calculated from the following:    Height as of this encounter: 5' 8\" (1.727 m). Weight as of this encounter: 250 lb (113.4 kg). ,No LMP for male patient. Physical Exam  Vitals and nursing note reviewed. Constitutional:       Appearance: He is obese. HENT:      Head: Normocephalic. Right Ear: Tympanic membrane normal.      Left Ear: Tympanic membrane normal.      Nose: No congestion or rhinorrhea. Eyes:      Conjunctiva/sclera: Conjunctivae normal.      Pupils: Pupils are equal, round, and reactive to light. Cardiovascular:      Rate and Rhythm: Normal rate and regular rhythm. Heart sounds: Normal heart sounds. Pulmonary:      Effort: Pulmonary effort is normal. No respiratory distress. Breath sounds: Normal breath sounds. No wheezing. Comments: Dry cough  Skin:     General: Skin is warm and dry. DIAGNOSTIC RESULTS     Labs:No results found for this visit on 05/19/23. IMAGING:    No orders to display         EKG:      URGENT CARE COURSE:     Vitals:    05/19/23 1019   BP: (!) 145/88   Pulse: 85   Resp: 18   Temp: 97 °F (36.1 °C)   TempSrc: Tympanic   SpO2: 95%   Weight: 250 lb (113.4 kg)   Height: 5' 8\" (1.727 m)       Medications - No data to display         PROCEDURES:  None    FINAL IMPRESSION      1. URI with cough and congestion          DISPOSITION/ PLAN   Patient diagnosed with viral uri with cough and congestion. Patient educated to try home remedies as requested such as tumeric, honey, and sky. Patient also educated to take mucinex to thin mucus.  Patient educated to take steam showers or use a

## 2023-05-19 NOTE — DISCHARGE INSTRUCTIONS
Tylenol and Motrin  Mucinex to help thin mucus  Steam showers  Try your home remedies  Tessalon Perles as needed for cough

## 2023-05-23 ENCOUNTER — HOSPITAL ENCOUNTER (EMERGENCY)
Age: 33
Discharge: HOME | End: 2023-05-23
Payer: COMMERCIAL

## 2023-05-23 VITALS
DIASTOLIC BLOOD PRESSURE: 88 MMHG | TEMPERATURE: 98.3 F | HEART RATE: 100 BPM | RESPIRATION RATE: 20 BRPM | SYSTOLIC BLOOD PRESSURE: 149 MMHG | WEIGHT: 280 LBS | BODY MASS INDEX: 42.57 KG/M2 | OXYGEN SATURATION: 97 %

## 2023-05-23 DIAGNOSIS — J01.90 ACUTE SINUSITIS, RECURRENCE NOT SPECIFIED, UNSPECIFIED LOCATION: Primary | ICD-10-CM

## 2023-05-23 PROCEDURE — 99213 OFFICE O/P EST LOW 20 MIN: CPT

## 2023-05-23 PROCEDURE — 99213 OFFICE O/P EST LOW 20 MIN: CPT | Performed by: NURSE PRACTITIONER

## 2023-05-23 RX ORDER — AZELASTINE 1 MG/ML
1 SPRAY, METERED NASAL 2 TIMES DAILY
Qty: 30 ML | Refills: 0 | Status: SHIPPED | OUTPATIENT
Start: 2023-05-23

## 2023-05-23 RX ORDER — AMOXICILLIN 500 MG/1
500 CAPSULE ORAL 2 TIMES DAILY
Qty: 14 CAPSULE | Refills: 0 | Status: SHIPPED | OUTPATIENT
Start: 2023-05-23 | End: 2023-05-30

## 2023-05-23 ASSESSMENT — ENCOUNTER SYMPTOMS
CHEST TIGHTNESS: 0
SORE THROAT: 1
SHORTNESS OF BREATH: 0
SINUS CONGESTION: 1
CHOKING: 0
COUGH: 1
SINUS PRESSURE: 0
APNEA: 0
WHEEZING: 0
STRIDOR: 0
RHINORRHEA: 0
EYE DISCHARGE: 0

## 2023-05-23 ASSESSMENT — PAIN - FUNCTIONAL ASSESSMENT: PAIN_FUNCTIONAL_ASSESSMENT: NONE - DENIES PAIN

## 2023-05-23 NOTE — ED TRIAGE NOTES
Patient to room with c/o increased nonproductive cough, and vomiting with cough, beginning one week ago.
GOOD

## 2023-05-23 NOTE — ED PROVIDER NOTES
[facility]  Urgent Care Encounter      CHIEF COMPLAINT       Chief Complaint   Patient presents with    Cough     vomiting           Nurses Notes reviewed and I agree except as noted in the HPI. HISTORY OFPRESENT ILLNESS   Chad Ratliff is a 27 y.o. The history is provided by the patient. No  was used. Cough  Cough characteristics:  Dry, harsh, nocturnal, supine and vomit-inducing  Sputum characteristics:  Unable to specify  Severity:  Severe  Onset quality:  Gradual  Duration:  1 week  Timing:  Constant  Progression:  Worsening  Chronicity:  New  Context: upper respiratory infection and weather changes    Context: not animal exposure, not exposure to allergens, not fumes, not occupational exposure, not sick contacts, not smoke exposure and not with activity    Relieved by:  Nothing  Worsened by:  Lying down  Ineffective treatments:  None tried  Associated symptoms: diaphoresis, headaches, sinus congestion and sore throat    Associated symptoms: no chest pain, no chills, no ear fullness, no ear pain, no eye discharge, no fever, no myalgias, no rash, no rhinorrhea, no shortness of breath, no weight loss and no wheezing    Risk factors: no chemical exposure, no recent infection and no recent travel      REVIEW OF SYSTEMS     Review of Systems   Constitutional:  Positive for activity change, appetite change, diaphoresis and fatigue. Negative for chills, fever and weight loss. HENT:  Positive for postnasal drip and sore throat. Negative for congestion, ear pain, rhinorrhea and sinus pressure. Eyes:  Negative for discharge. Respiratory:  Positive for cough. Negative for apnea, choking, chest tightness, shortness of breath, wheezing and stridor. Cardiovascular:  Negative for chest pain, palpitations and leg swelling. Musculoskeletal:  Negative for myalgias. Skin:  Negative for rash. Neurological:  Positive for headaches.      PAST MEDICAL HISTORY

## 2023-10-03 ENCOUNTER — HOSPITAL ENCOUNTER (EMERGENCY)
Age: 33
Discharge: HOME | End: 2023-10-03
Payer: COMMERCIAL

## 2023-10-03 VITALS
OXYGEN SATURATION: 100 % | HEART RATE: 75 BPM | RESPIRATION RATE: 20 BRPM | SYSTOLIC BLOOD PRESSURE: 146 MMHG | TEMPERATURE: 97 F | DIASTOLIC BLOOD PRESSURE: 78 MMHG

## 2023-10-03 DIAGNOSIS — B97.89 VIRAL SORE THROAT: Primary | ICD-10-CM

## 2023-10-03 DIAGNOSIS — J02.8 VIRAL SORE THROAT: Primary | ICD-10-CM

## 2023-10-03 LAB — S PYO AG THROAT QL: NEGATIVE

## 2023-10-03 PROCEDURE — 87651 STREP A DNA AMP PROBE: CPT

## 2023-10-03 PROCEDURE — 99213 OFFICE O/P EST LOW 20 MIN: CPT

## 2023-10-03 RX ORDER — ETODOLAC 400 MG/1
400 TABLET, FILM COATED ORAL 2 TIMES DAILY
COMMUNITY
Start: 2023-09-28

## 2023-10-03 RX ORDER — CYCLOBENZAPRINE HCL 10 MG
10 TABLET ORAL 3 TIMES DAILY PRN
COMMUNITY
Start: 2022-08-01

## 2023-10-03 ASSESSMENT — ENCOUNTER SYMPTOMS
SHORTNESS OF BREATH: 0
VOMITING: 0
SORE THROAT: 1
ABDOMINAL PAIN: 0
NAUSEA: 0
DIARRHEA: 0
COUGH: 0
WHEEZING: 0
SINUS PRESSURE: 0

## 2023-10-03 ASSESSMENT — PAIN SCALES - GENERAL: PAINLEVEL_OUTOF10: 2

## 2023-10-03 NOTE — DISCHARGE INSTRUCTIONS
COLD-EEZE  over the counter: Use as directed on package. Zinc gluconate lozenges  are used to help make cold symptoms less severe or shorter in duration. This includes sore throat, cough, sneezing, stuffy nose, and a hoarse voice. Pick age appropriate dose. none

## 2024-05-16 ENCOUNTER — HOSPITAL ENCOUNTER (EMERGENCY)
Age: 34
Discharge: HOME | End: 2024-05-16
Payer: COMMERCIAL

## 2024-05-16 VITALS
HEART RATE: 100 BPM | TEMPERATURE: 99.5 F | BODY MASS INDEX: 37.89 KG/M2 | SYSTOLIC BLOOD PRESSURE: 122 MMHG | OXYGEN SATURATION: 95 % | RESPIRATION RATE: 18 BRPM | WEIGHT: 250 LBS | DIASTOLIC BLOOD PRESSURE: 89 MMHG | HEIGHT: 68 IN

## 2024-05-16 DIAGNOSIS — J01.00 ACUTE NON-RECURRENT MAXILLARY SINUSITIS: Primary | ICD-10-CM

## 2024-05-16 PROCEDURE — 99213 OFFICE O/P EST LOW 20 MIN: CPT

## 2024-05-16 RX ORDER — AMOXICILLIN AND CLAVULANATE POTASSIUM 875; 125 MG/1; MG/1
1 TABLET, FILM COATED ORAL 2 TIMES DAILY
Qty: 14 TABLET | Refills: 0 | Status: SHIPPED | OUTPATIENT
Start: 2024-05-16 | End: 2024-05-23

## 2024-05-16 ASSESSMENT — ENCOUNTER SYMPTOMS
NAUSEA: 0
SINUS PRESSURE: 1
VOMITING: 0
SINUS PAIN: 1
SORE THROAT: 0
DIARRHEA: 0
RESPIRATORY NEGATIVE: 1

## 2024-05-16 ASSESSMENT — PAIN DESCRIPTION - LOCATION: LOCATION: FACE

## 2024-05-16 ASSESSMENT — PAIN DESCRIPTION - PAIN TYPE: TYPE: ACUTE PAIN

## 2024-05-16 ASSESSMENT — PAIN - FUNCTIONAL ASSESSMENT
PAIN_FUNCTIONAL_ASSESSMENT: 0-10
PAIN_FUNCTIONAL_ASSESSMENT: ACTIVITIES ARE NOT PREVENTED

## 2024-05-16 ASSESSMENT — PAIN DESCRIPTION - FREQUENCY: FREQUENCY: CONTINUOUS

## 2024-05-16 ASSESSMENT — PAIN SCALES - GENERAL: PAINLEVEL_OUTOF10: 8

## 2024-05-16 ASSESSMENT — PAIN DESCRIPTION - DESCRIPTORS: DESCRIPTORS: PRESSURE

## 2024-05-16 ASSESSMENT — PAIN DESCRIPTION - ONSET: ONSET: GRADUAL

## 2024-05-16 NOTE — DISCHARGE INSTRUCTIONS
Take Augmentin as prescribed.  Increase fluid intake, diet as tolerated.  Over-the-counter Mucinex DM as needed for congestion.  Follow-up with family doctor in 1 week if symptoms do not improve.  Return as needed for new or worsening symptoms

## 2024-05-16 NOTE — ED PROVIDER NOTES
[facility] URGENT CARE  Urgent Care Encounter       CHIEF COMPLAINT       Chief Complaint   Patient presents with    Facial Pain     pressure    Nasal Congestion       Nurses Notes reviewed and I agree except as noted in the HPI.  HISTORY OF PRESENT ILLNESS   Piotr Cheng is a 33 y.o. male who presents nasal congestion and maxillary sinus pressure.  Patient's symptoms have been going on for the last 9 to 10 days.  Patient states he has tried Zyrtec with no relief.  Patient denies sore throat, fever, body aches.    The history is provided by the patient. No  was used.       REVIEW OF SYSTEMS     Review of Systems   Constitutional:  Negative for fever.   HENT:  Positive for congestion, sinus pressure and sinus pain. Negative for sore throat.    Respiratory: Negative.     Cardiovascular: Negative.    Gastrointestinal:  Negative for diarrhea, nausea and vomiting.       PAST MEDICAL HISTORY         Diagnosis Date    Psychiatric problem        SURGICALHISTORY     Patient  has no past surgical history on file.    CURRENT MEDICATIONS       Discharge Medication List as of 5/16/2024  8:47 AM          ALLERGIES     Patient is has No Known Allergies.    Patients   There is no immunization history on file for this patient.    FAMILY HISTORY     Patient's family history is not on file.    SOCIAL HISTORY     Patient  reports that he has quit smoking. His smoking use included cigarettes. He uses smokeless tobacco. He reports that he does not currently use alcohol. He reports that he does not currently use drugs.    PHYSICAL EXAM     ED TRIAGE VITALS  BP: 122/89, Temp: 99.5 °F (37.5 °C), Pulse: 100, Respirations: 18, SpO2: 95 %,Estimated body mass index is 38.01 kg/m² as calculated from the following:    Height as of this encounter: 1.727 m (5' 8\").    Weight as of this encounter: 113.4 kg (250 lb).,No LMP for male patient.    Physical Exam  Constitutional:       General: He is not in acute  Dyclonine-Glycerin (CEPACOL SORE THROAT SPRAY) 0.1-33 % LIQD Comments:   Reason for Stopping:         azelastine (ASTELIN) 0.1 % nasal spray Comments:   Reason for Stopping:         busPIRone (BUSPAR) 15 MG tablet Comments:   Reason for Stopping:         hydrOXYzine HCl (ATARAX) 25 MG tablet Comments:   Reason for Stopping:               Discharge Medication List as of 5/16/2024  8:47 AM          RUSLAN Echeverria CNP    (Please note that portions of this note were completed with a voice recognition program. Efforts were made to edit the dictations but occasionally words are mis-transcribed.)          Chana Humphreys APRN - CNP  05/16/24 0850       Chana Humphreys APRN - CNP  05/16/24 1012

## 2024-06-11 ENCOUNTER — HOSPITAL ENCOUNTER (EMERGENCY)
Age: 34
Discharge: HOME | End: 2024-06-11
Payer: COMMERCIAL

## 2024-06-11 ENCOUNTER — APPOINTMENT (OUTPATIENT)
Dept: GENERAL RADIOLOGY | Age: 34
End: 2024-06-11
Payer: COMMERCIAL

## 2024-06-11 VITALS
WEIGHT: 279 LBS | BODY MASS INDEX: 42.28 KG/M2 | SYSTOLIC BLOOD PRESSURE: 140 MMHG | HEIGHT: 68 IN | OXYGEN SATURATION: 97 % | HEART RATE: 100 BPM | TEMPERATURE: 98.2 F | DIASTOLIC BLOOD PRESSURE: 88 MMHG | RESPIRATION RATE: 19 BRPM

## 2024-06-11 DIAGNOSIS — S63.501A SPRAIN OF RIGHT WRIST, INITIAL ENCOUNTER: Primary | ICD-10-CM

## 2024-06-11 PROCEDURE — 73110 X-RAY EXAM OF WRIST: CPT

## 2024-06-11 PROCEDURE — 99283 EMERGENCY DEPT VISIT LOW MDM: CPT

## 2024-06-12 NOTE — ED TRIAGE NOTES
Pt presents to the ED through lobby with c/o right wrist pain. Pt states he was bringing up laundry when he fell and landed on his wrist. States he has pain with movement. No deformity noted. Denies taking anything for pain

## 2024-06-12 NOTE — DISCHARGE INSTRUCTIONS
Take your medication as indicated and prescribed.  For pain use acetaminophen (Tylenol) or ibuprofen (Motrin / Advil), unless prescribed medications that have acetaminophen or ibuprofen (or similar medications) in it.  You can take over the counter acetaminophen tablets (1 - 2 tablets of the 500-mg strength every 6 hours) or ibuprofen tablets (2 tablets every 4 hours).    Use an ice pack or bag filled with ice and apply to the injured area 3 - 4 times a day for 15 - 20 minutes each time.  If the injury is older than 3 days, then use a heating pad to help relax the muscles.    PLEASE RETURN TO THE EMERGENCY DEPARTMENT IMMEDIATELY for worsening of pain, worse swelling to your wrist, inability to move your wrist or fingers, or if you develop any concerning symptoms such as: high fever not relieved by acetaminophen (Tylenol) and/or ibuprofen (Motrin / Advil), chills, feeling of your heart fluttering or racing, persistent nausea and/or vomiting, vomiting up blood, blood in your stool, loss of consciousness, numbness, weakness or tingling in the arms or legs or change in color of the extremities, changes in mental status, persistent headache, blurry vision, loss of bladder / bowel control, unable to follow up with your physician, or other any other care or concern.

## 2024-06-12 NOTE — ED PROVIDER NOTES
[facility] EMERGENCY DEPT      EMERGENCY MEDICINE     Pt Name: Piotr Cheng  MRN: [id number]  Birthdate 1990  Date of evaluation: 6/11/2024  Provider: RUSLAN Hurtado CNP    CHIEF COMPLAINT       Chief Complaint   Patient presents with    Wrist Pain     Right       HISTORY OF PRESENT ILLNESS   Piotr Cheng is a pleasant 33 y.o. male who presents to the emergency department from home with c/o right wrist pain after falling on a flexed hand 2 hours ago. He was carrying laundry when he tripped.  LROM due to pain.  Has not taken anything for pain.  Intact finger opposition, sensation, and good  strength.       History is obtained from:  patient  PASTMEDICAL HISTORY     Past Medical History:   Diagnosis Date    Psychiatric problem        Patient Active Problem List   Diagnosis Code    Depression with suicidal ideation F32.A, R45.851     SURGICAL HISTORY     History reviewed. No pertinent surgical history.    CURRENT MEDICATIONS       There are no discharge medications for this patient.      ALLERGIES     has No Known Allergies.    FAMILY HISTORY     He indicated that his mother is alive. He indicated that his father is alive. He indicated that his sister is alive.       SOCIAL HISTORY       Social History     Tobacco Use    Smoking status: Former     Types: Cigarettes    Smokeless tobacco: Current   Vaping Use    Vaping Use: Every day    Substances: Nicotine    Devices: Disposable    Passive vaping exposure: Yes   Substance Use Topics    Alcohol use: Not Currently    Drug use: Not Currently       PHYSICAL EXAM       ED Triage Vitals [06/11/24 2134]   BP Temp Temp Source Pulse Respirations SpO2 Height Weight - Scale   (!) 140/88 98.2 °F (36.8 °C) Oral 100 19 97 % 1.727 m (5' 8\") 126.6 kg (279 lb)       Physical Exam  Constitutional:       Appearance: Normal appearance. He is well-developed. He is obese. He is not ill-appearing.   HENT:      Head: Normocephalic and atraumatic.      Nose: Nose